# Patient Record
Sex: MALE | Race: WHITE | NOT HISPANIC OR LATINO | Employment: UNEMPLOYED | URBAN - METROPOLITAN AREA
[De-identification: names, ages, dates, MRNs, and addresses within clinical notes are randomized per-mention and may not be internally consistent; named-entity substitution may affect disease eponyms.]

---

## 2023-04-11 ENCOUNTER — OFFICE VISIT (OUTPATIENT)
Dept: PRIMARY CARE | Facility: CLINIC | Age: 43
End: 2023-04-11

## 2023-04-11 VITALS
OXYGEN SATURATION: 98 % | WEIGHT: 185 LBS | TEMPERATURE: 96.8 F | DIASTOLIC BLOOD PRESSURE: 70 MMHG | HEART RATE: 75 BPM | HEIGHT: 70 IN | BODY MASS INDEX: 26.48 KG/M2 | SYSTOLIC BLOOD PRESSURE: 124 MMHG

## 2023-04-11 DIAGNOSIS — G43.109 MIGRAINE WITH AURA AND WITHOUT STATUS MIGRAINOSUS, NOT INTRACTABLE: Primary | ICD-10-CM

## 2023-04-11 DIAGNOSIS — L74.512 HYPERHIDROSIS OF HANDS: Chronic | ICD-10-CM

## 2023-04-11 PROBLEM — R79.89 LOW SERUM TESTOSTERONE LEVEL: Status: ACTIVE | Noted: 2023-04-11

## 2023-04-11 PROBLEM — R10.9 ABDOMINAL PAIN: Status: ACTIVE | Noted: 2023-04-11

## 2023-04-11 PROBLEM — E29.1 HYPOGONADISM MALE: Status: ACTIVE | Noted: 2023-04-11

## 2023-04-11 PROBLEM — H10.431: Status: ACTIVE | Noted: 2023-04-11

## 2023-04-11 PROBLEM — R53.83 FATIGUE: Status: ACTIVE | Noted: 2023-04-11

## 2023-04-11 PROBLEM — G47.00 INSOMNIA: Status: ACTIVE | Noted: 2023-04-11

## 2023-04-11 PROBLEM — R10.11 ABDOMINAL PAIN, RUQ (RIGHT UPPER QUADRANT): Status: RESOLVED | Noted: 2023-04-11 | Resolved: 2023-04-11

## 2023-04-11 PROBLEM — H02.889 MGD (MEIBOMIAN GLAND DYSFUNCTION): Status: ACTIVE | Noted: 2023-04-11

## 2023-04-11 PROBLEM — N41.9 PROSTATITIS: Status: ACTIVE | Noted: 2023-04-11

## 2023-04-11 PROBLEM — J30.9 ALLERGIC RHINITIS: Status: ACTIVE | Noted: 2023-04-11

## 2023-04-11 PROBLEM — F32.A DEPRESSION: Status: ACTIVE | Noted: 2023-04-11

## 2023-04-11 PROBLEM — E55.9 VITAMIN D DEFICIENCY: Status: ACTIVE | Noted: 2023-04-11

## 2023-04-11 PROBLEM — H01.009 BLEPHARITIS: Status: ACTIVE | Noted: 2023-04-11

## 2023-04-11 PROBLEM — N62 HYPERTROPHY OF BREAST: Status: ACTIVE | Noted: 2023-04-11

## 2023-04-11 PROBLEM — F41.9 ANXIETY DISORDER: Status: ACTIVE | Noted: 2023-04-11

## 2023-04-11 PROBLEM — A74.0: Status: RESOLVED | Noted: 2023-04-11 | Resolved: 2023-04-11

## 2023-04-11 PROBLEM — R51.9 HEADACHE: Status: ACTIVE | Noted: 2023-04-11

## 2023-04-11 PROCEDURE — 99213 OFFICE O/P EST LOW 20 MIN: CPT | Performed by: FAMILY MEDICINE

## 2023-04-11 RX ORDER — TRAMADOL HYDROCHLORIDE 50 MG/1
50 TABLET ORAL EVERY 6 HOURS PRN
Qty: 15 TABLET | Refills: 0 | Status: SHIPPED | OUTPATIENT
Start: 2023-04-11 | End: 2023-04-18

## 2023-04-11 RX ORDER — PROPRANOLOL HYDROCHLORIDE 20 MG/1
1 TABLET ORAL DAILY
COMMUNITY
Start: 2013-11-27

## 2023-04-11 RX ORDER — METHYLPREDNISOLONE 4 MG/1
TABLET ORAL
Qty: 21 TABLET | Refills: 0 | Status: SHIPPED | OUTPATIENT
Start: 2023-04-11 | End: 2023-04-18

## 2023-04-11 RX ORDER — LORATADINE 10 MG/1
1 TABLET ORAL DAILY
COMMUNITY
Start: 2013-11-27

## 2023-04-11 RX ORDER — DOXYCYCLINE HYCLATE 100 MG
1 TABLET ORAL EVERY 12 HOURS
COMMUNITY
Start: 2021-10-22

## 2023-04-11 RX ORDER — TIZANIDINE HYDROCHLORIDE 4 MG/1
4 CAPSULE, GELATIN COATED ORAL 3 TIMES DAILY
Qty: 90 CAPSULE | Refills: 11 | Status: SHIPPED | OUTPATIENT
Start: 2023-04-11 | End: 2023-04-12

## 2023-04-11 RX ORDER — CYCLOBENZAPRINE HCL 5 MG
1 TABLET ORAL 3 TIMES DAILY PRN
COMMUNITY
Start: 2021-01-13 | End: 2023-04-11 | Stop reason: ALTCHOICE

## 2023-04-11 RX ORDER — CHOLECALCIFEROL (VITAMIN D3) 25 MCG
TABLET ORAL
COMMUNITY
Start: 2020-10-26

## 2023-04-11 RX ORDER — SUMATRIPTAN 50 MG/1
TABLET, FILM COATED ORAL
COMMUNITY
Start: 2021-01-13

## 2023-04-11 ASSESSMENT — PAIN SCALES - GENERAL: PAINLEVEL: 0-NO PAIN

## 2023-04-11 NOTE — PROGRESS NOTES
Subjective   Patient ID: Chris Engle is a 42 y.o. male.    Also with hyperhydrosis of hands. Severe.    Migraine   This is a recurrent problem. The current episode started more than 1 year ago. The problem occurs intermittently. The problem has been gradually worsening. The pain is located in the Bilateral region. The pain quality is similar to prior headaches. The quality of the pain is described as shooting, pulsating and stabbing. Associated symptoms include neck pain. Pertinent negatives include no abdominal pain, back pain, coughing, dizziness, ear pain, eye pain, fever, hearing loss, nausea, numbness, rhinorrhea, sore throat, vomiting or weakness. The symptoms are aggravated by emotional stress and work. He has tried acetaminophen, cold packs, darkened room, Excedrin, NSAIDs and triptans for the symptoms. The treatment provided moderate relief. His past medical history is significant for migraine headaches. There is no history of hypertension or recent head traumas.       Review of Systems   Constitutional:  Negative for fatigue, fever and unexpected weight change.   HENT:  Negative for congestion, ear pain, hearing loss, rhinorrhea, sore throat and trouble swallowing.    Eyes:  Negative for pain and visual disturbance.   Respiratory:  Negative for cough and shortness of breath.    Cardiovascular:  Negative for chest pain, palpitations and leg swelling.   Gastrointestinal:  Negative for abdominal pain, blood in stool, diarrhea, nausea and vomiting.   Genitourinary:  Negative for dysuria, frequency, hematuria and urgency.   Musculoskeletal:  Positive for neck pain. Negative for back pain and joint swelling.   Skin:  Negative for pallor and rash.   Neurological:  Positive for headaches. Negative for dizziness, syncope, weakness and numbness.   Psychiatric/Behavioral:  Negative for confusion, decreased concentration, hallucinations and suicidal ideas.      Vitals:    04/11/23 1606   BP: 124/70   Pulse: 75    Temp: 36 °C (96.8 °F)   SpO2: 98%      Objective   Physical Exam  Constitutional:       Appearance: Normal appearance.   Neck:      Comments: Tender over L C2-3 facet area.  Cardiovascular:      Rate and Rhythm: Normal rate and regular rhythm.      Heart sounds: Normal heart sounds.   Pulmonary:      Effort: Pulmonary effort is normal.      Breath sounds: Normal breath sounds.   Musculoskeletal:      Cervical back: Neck supple.   Skin:     General: Skin is warm and dry.   Neurological:      General: No focal deficit present.      Mental Status: He is alert.   Psychiatric:         Mood and Affect: Mood normal.         Speech: Speech normal.         Behavior: Behavior normal.         Cognition and Memory: Cognition normal.         Assessment/Plan   Diagnoses and all orders for this visit:  Migraine with aura and without status migrainosus, not intractable  Comments:  Component of occiptial neuralgia, discussed exercises, prevention. Consider nerve ablation.  Orders:  -     methylPREDNISolone (Medrol Dospak) 4 mg tablets; Take as directed on package.  -     traMADol (Ultram) 50 mg tablet; Take 1 tablet (50 mg) by mouth every 6 hours if needed for severe pain (7 - 10) for up to 7 days.  Hyperhidrosis of hands  Comments:  Refer to thoracic surgery for sympathectomy.  Orders:  -     Referral to Thoracic Surgery; Future

## 2023-04-12 DIAGNOSIS — G43.109 MIGRAINE WITH AURA AND WITHOUT STATUS MIGRAINOSUS, NOT INTRACTABLE: Primary | ICD-10-CM

## 2023-04-12 RX ORDER — TIZANIDINE 4 MG/1
4 TABLET ORAL EVERY 6 HOURS PRN
Qty: 40 TABLET | Refills: 2 | Status: SHIPPED | OUTPATIENT
Start: 2023-04-12 | End: 2023-04-22

## 2023-04-23 ASSESSMENT — ENCOUNTER SYMPTOMS
NAUSEA: 0
HEADACHES: 1
NECK PAIN: 1
HALLUCINATIONS: 0
EYE PAIN: 0
VOMITING: 0
WEAKNESS: 0
FEVER: 0
DIZZINESS: 0
BLOOD IN STOOL: 0
FREQUENCY: 0
DECREASED CONCENTRATION: 0
ABDOMINAL PAIN: 0
HEMATURIA: 0
CONFUSION: 0
PALPITATIONS: 0
DYSURIA: 0
TROUBLE SWALLOWING: 0
DIARRHEA: 0
FATIGUE: 0
JOINT SWELLING: 0
SORE THROAT: 0
BACK PAIN: 0
RHINORRHEA: 0
COUGH: 0
MIGRAINE HEADACHES: 1
SHORTNESS OF BREATH: 0
UNEXPECTED WEIGHT CHANGE: 0
NUMBNESS: 0

## 2023-06-01 ENCOUNTER — OFFICE VISIT (OUTPATIENT)
Dept: PRIMARY CARE | Facility: CLINIC | Age: 43
End: 2023-06-01
Payer: COMMERCIAL

## 2023-06-01 VITALS
SYSTOLIC BLOOD PRESSURE: 128 MMHG | HEIGHT: 70 IN | HEART RATE: 83 BPM | WEIGHT: 184 LBS | OXYGEN SATURATION: 99 % | TEMPERATURE: 97.2 F | BODY MASS INDEX: 26.34 KG/M2 | DIASTOLIC BLOOD PRESSURE: 84 MMHG

## 2023-06-01 DIAGNOSIS — M62.838 TRAPEZIUS MUSCLE SPASM: ICD-10-CM

## 2023-06-01 DIAGNOSIS — G43.109 MIGRAINE WITH AURA AND WITHOUT STATUS MIGRAINOSUS, NOT INTRACTABLE: Primary | ICD-10-CM

## 2023-06-01 DIAGNOSIS — R10.31 RIGHT LOWER QUADRANT ABDOMINAL PAIN: ICD-10-CM

## 2023-06-01 DIAGNOSIS — L74.512 HYPERHIDROSIS OF HANDS: ICD-10-CM

## 2023-06-01 DIAGNOSIS — M54.81 OCCIPITAL NEURALGIA OF LEFT SIDE: ICD-10-CM

## 2023-06-01 PROBLEM — R10.9 ABDOMINAL PAIN: Status: RESOLVED | Noted: 2023-04-11 | Resolved: 2023-06-01

## 2023-06-01 PROBLEM — N41.9 PROSTATITIS: Status: RESOLVED | Noted: 2023-04-11 | Resolved: 2023-06-01

## 2023-06-01 PROBLEM — R61 HYPERHIDROSIS: Status: ACTIVE | Noted: 2018-12-27

## 2023-06-01 PROBLEM — R51.9 HEADACHE: Status: RESOLVED | Noted: 2023-04-11 | Resolved: 2023-06-01

## 2023-06-01 PROBLEM — R61 HYPERHIDROSIS: Status: RESOLVED | Noted: 2018-12-27 | Resolved: 2023-06-01

## 2023-06-01 PROBLEM — H01.009 BLEPHARITIS: Status: RESOLVED | Noted: 2023-04-11 | Resolved: 2023-06-01

## 2023-06-01 PROCEDURE — 99214 OFFICE O/P EST MOD 30 MIN: CPT | Performed by: FAMILY MEDICINE

## 2023-06-01 PROCEDURE — 20553 NJX 1/MLT TRIGGER POINTS 3/>: CPT | Performed by: FAMILY MEDICINE

## 2023-06-01 RX ORDER — MULTIVITAMIN
1 TABLET ORAL
COMMUNITY

## 2023-06-01 ASSESSMENT — ENCOUNTER SYMPTOMS
SHORTNESS OF BREATH: 0
NUMBNESS: 0
UNEXPECTED WEIGHT CHANGE: 0
SORE THROAT: 0
ABDOMINAL PAIN: 1
PALPITATIONS: 0
DECREASED CONCENTRATION: 0
FREQUENCY: 0
DIAPHORESIS: 1
EYE PAIN: 0
TROUBLE SWALLOWING: 0
DYSURIA: 0
BLOOD IN STOOL: 0
CONFUSION: 0
DIZZINESS: 0
JOINT SWELLING: 0
HEADACHES: 0
WEAKNESS: 0
HALLUCINATIONS: 0
VOMITING: 0
NAUSEA: 0
FATIGUE: 1
DIARRHEA: 0
COUGH: 0
FEVER: 0
HEMATURIA: 0

## 2023-06-01 ASSESSMENT — PAIN SCALES - GENERAL: PAINLEVEL: 0-NO PAIN

## 2023-06-01 NOTE — PATIENT INSTRUCTIONS
It was nice to see you today!  Discussed current concerns and addressed   Reviewed recent labs and diagnostics  Reviewed medications list  Continue to eat a healthy diet, exercise at least 3 times a week or more  Plan and follow up discussed  For any further information related to your condition, copy and paste or go to [email or web address]  Discussed chronic tramadol not appropriate in his situation  I realize he is in pain but will need to try other options such as medications and PT  Discussed ablation with pain man and he is open to this. We will get things going as it may take a while to get in to pain management  To GI, should have scope soon with family history, discussed diet  Trigger point injection given, send for massage therapy at [facility]

## 2023-06-01 NOTE — PROGRESS NOTES
Subjective   Patient ID: Chris Engle is a 42 y.o. male.    Patient has chronic headaches and left neck pain.  It sounds like occipital neuralgia.  It is worse in the left lateral neck, the trapezius gets tender and it radiates in a josh horn distribution in the left part of the head.  It is positional and random in onset.  It has been getting worse over the last few years.  He just got insurance.  He has not had an MRI.  He has no focal neurological deficits such as numbness, tingling, weakness or visual changes.  Triptans did not work.  I gave him a Medrol Dosepak and it seemed like it might of helped but not right away.  Massage helps.  He has not done physical therapy.  He has done stretches on his own.  He has had a few prescriptions of tramadol but I discussed that this is not a solution to the problem.  He has a medical marijuana card and I also discussed that most likely he will not find someone to write that form as long as he is filling the edibles.  Also with severe hyperhidrosis of the hands.  I have given him the name of a thoracic surgeon for sympathectomy.  He has not called yet.  Also with right middle quadrant pain off and on for years.  More towards upper quadrant.  Ultrasound is unremarkable.  It is intermittent and possibly with eating.  No specific foods.  He has about 3 first and second-degree family members with colon cancer.  He has no blood in the stool or unexplained weight loss.        Review of Systems   Constitutional:  Positive for diaphoresis and fatigue. Negative for fever and unexpected weight change.   HENT:  Negative for congestion, ear pain, hearing loss, sore throat and trouble swallowing.    Eyes:  Negative for pain and visual disturbance.   Respiratory:  Negative for cough and shortness of breath.    Cardiovascular:  Negative for chest pain, palpitations and leg swelling.   Gastrointestinal:  Positive for abdominal pain. Negative for blood in stool, diarrhea, nausea and  vomiting.   Genitourinary:  Negative for dysuria, frequency, hematuria and urgency.   Musculoskeletal:  Negative for joint swelling.   Skin:  Negative for pallor and rash.   Neurological:  Negative for dizziness, syncope, weakness, numbness and headaches.   Psychiatric/Behavioral:  Negative for confusion, decreased concentration, hallucinations and suicidal ideas.      Vitals:    06/01/23 1018   BP: 128/84   Pulse: 83   Temp: 36.2 °C (97.2 °F)   SpO2: 99%      Objective   Physical Exam  Constitutional:       Appearance: Normal appearance. He is not toxic-appearing.   HENT:      Head: Normocephalic and atraumatic.      Right Ear: Tympanic membrane normal.      Left Ear: Tympanic membrane normal.      Nose: Nose normal.      Mouth/Throat:      Mouth: Mucous membranes are moist.      Pharynx: Oropharynx is clear. No oropharyngeal exudate or posterior oropharyngeal erythema.   Eyes:      General: No scleral icterus.     Extraocular Movements: Extraocular movements intact.      Conjunctiva/sclera: Conjunctivae normal.      Pupils: Pupils are equal, round, and reactive to light.   Neck:      Comments: Tender L trapezius area. Cleaned and inj 9cc of soln on 1cc kenalog, 4cc marcaine, 4cc lidocaine. Help with current pain and spasm.  Cardiovascular:      Rate and Rhythm: Normal rate and regular rhythm.      Pulses: Normal pulses.   Pulmonary:      Effort: No respiratory distress.      Breath sounds: Normal breath sounds. No wheezing or rhonchi.   Abdominal:      Palpations: Abdomen is soft. There is no mass.      Tenderness: There is abdominal tenderness.   Musculoskeletal:         General: Normal range of motion.      Cervical back: Rigidity and tenderness present.   Lymphadenopathy:      Cervical: No cervical adenopathy.   Skin:     General: Skin is warm and dry.      Findings: No rash.   Neurological:      General: No focal deficit present.      Mental Status: He is alert and oriented to person, place, and time.       Cranial Nerves: No cranial nerve deficit.      Motor: No weakness.   Psychiatric:         Mood and Affect: Mood normal.         Behavior: Behavior normal.         Thought Content: Thought content normal.         Assessment/Plan   Diagnoses and all orders for this visit:  Migraine with aura and without status migrainosus, not intractable  Hyperhidrosis of hands  Occipital neuralgia of left side  -     Referral to Pain Medicine; Future  -     MR cervical spine wo IV contrast; Future  -     Referral to [facility]; Future  Right lower quadrant abdominal pain  -     Referral to Gastroenterology; Future

## 2023-06-14 ENCOUNTER — APPOINTMENT (OUTPATIENT)
Dept: PRIMARY CARE | Facility: CLINIC | Age: 43
End: 2023-06-14
Payer: COMMERCIAL

## 2023-10-23 ENCOUNTER — OFFICE VISIT (OUTPATIENT)
Dept: GASTROENTEROLOGY | Facility: CLINIC | Age: 43
End: 2023-10-23
Payer: COMMERCIAL

## 2023-10-23 VITALS — HEART RATE: 80 BPM | HEIGHT: 70 IN | BODY MASS INDEX: 26.34 KG/M2 | WEIGHT: 184 LBS

## 2023-10-23 DIAGNOSIS — R10.31 RIGHT LOWER QUADRANT ABDOMINAL PAIN: Primary | ICD-10-CM

## 2023-10-23 DIAGNOSIS — K59.00 CONSTIPATION, UNSPECIFIED CONSTIPATION TYPE: ICD-10-CM

## 2023-10-23 PROCEDURE — 99204 OFFICE O/P NEW MOD 45 MIN: CPT | Performed by: INTERNAL MEDICINE

## 2023-10-23 PROCEDURE — 1036F TOBACCO NON-USER: CPT | Performed by: INTERNAL MEDICINE

## 2023-10-23 RX ORDER — POLYETHYLENE GLYCOL-3350 AND ELECTROLYTES WITH FLAVOR PACK 240; 5.84; 2.98; 6.72; 22.72 G/278.26G; G/278.26G; G/278.26G; G/278.26G; G/278.26G
4000 POWDER, FOR SOLUTION ORAL ONCE
Qty: 4000 ML | Refills: 0 | Status: SHIPPED | OUTPATIENT
Start: 2023-10-23 | End: 2023-10-23

## 2023-10-23 RX ORDER — CYANOCOBALAMIN (VITAMIN B-12) 500 MCG
1 TABLET ORAL NIGHTLY PRN
COMMUNITY

## 2023-10-23 NOTE — PROGRESS NOTES
REASON FOR VISIT: Discuss occasional pain in her right lower abdomen    HPI:  Chris Engle is a 43 y.o. male with a past medical history of anxiety and depression being evaluated in the office for intermittent right lower quadrant pain symptoms.  Symptoms of been ongoing for several years intermittently.  Not necessarily postprandial.  Occasionally feels that he may have backed up in stool and will feel better after bowel movements.  No rectal bleeding.  Has strong history of colorectal cancer in maternal grandfather as well as maternal aunts.  Has never had a prior colonoscopy.  Right upper quadrant ultrasound with normal-appearing gallbladder 2 years ago.          PRIOR ENDOSCOPY    PAST MEDICAL HISTORY  Past Medical History:   Diagnosis Date    Adult inclusion conjunctivitis 04/11/2023    Personal history of other mental and behavioral disorders     History of anxiety disorder    Personal history of other specified conditions     History of insomnia    Prostatitis 04/11/2023       PAST SURGICAL HISTORY  Past Surgical History:   Procedure Laterality Date    OTHER SURGICAL HISTORY  01/30/2015    Oral Surgery       FAMILY HISTORY  Family History   Problem Relation Name Age of Onset    No Known Problems Mother      Colon cancer Other FAM HX        SOCIAL HISTORY  Social History     Tobacco Use    Smoking status: Never    Smokeless tobacco: Never   Substance Use Topics    Alcohol use: Not on file       REVIEW OF SYSTEMS  CONSTITUTIONAL: negative for fever, chills, fatigue, or unintentional weight loss,   HEENT: negative for icteric sclera, eye pain/redness, or changes in vision/hearing  RESPIRATORY: negative for cough, hemoptysis, wheezing, orthopnea, or dyspnea on exertion  CARDIOVASCULAR: negative for chest pain, palpitations, or syncope   GASTROINTESTINAL: as noted per HPI  GENITOURINARY: negative for dysuria, polyuria, incontinence, or hematuria  MUSCULOSKELETAL: negative for arthralgia, myalgia, or joint  "swelling/stiffness   INTEGUMENTARY/SKIN: negative jaundice, rash, or skin lesion  HEMATOLOGIC/LYMPHATIC: negative for prolonged bleeding, easy bruising, or swollen lymph nodes  ENDOCRINE: negative for cold/heat intolerance, polydipsia, polyuria, or goiter  NEUROLOGIC: negative for headaches, dizziness, tremor, or gait abnormality  PSYCHIATRIC: negative for anxiety, depression, personality changes, or sleep disturbances        A 10 point review of systems was completed and was otherwise negative.    ALLERGIES  Allergies   Allergen Reactions    Cat Dander Unknown     shortness of breath, swollen nasal passages    Pollen Extracts Other     Itchy eyes, runny nose, nasal congestion       MEDICATIONS  Current Outpatient Medications   Medication Sig Dispense Refill    cholecalciferol (Vitamin D-3) 25 MCG (1000 UT) tablet Take by mouth.      loratadine (Claritin) 10 mg tablet Take 1 tablet (10 mg) by mouth once daily.      multivitamin tablet Take 1 tablet by mouth once daily.      doxycycline (Vibra-Tabs) 100 mg tablet Take 1 tablet (100 mg) by mouth every 12 hours.      melatonin tablet 1 tablet (1 mg) as needed at bedtime for sleep.      polyethylene glycol-electrolytes (GaviLyte-C) 420 gram solution Take 4,000 mL by mouth 1 time for 1 dose. Drink 8 oz every 10-15 minutes until solution is gone 4000 mL 0    propranolol (Inderal) 20 mg tablet Take 1 tablet (20 mg) by mouth once daily.      SUMAtriptan (Imitrex) 50 mg tablet Take by mouth.      tiZANidine (Zanaflex) 4 mg tablet Take 1 tablet (4 mg) by mouth every 6 hours if needed for muscle spasms for up to 10 days. 40 tablet 2     No current facility-administered medications for this visit.       VITALS  Pulse 80   Ht 1.778 m (5' 10\")   Wt 83.5 kg (184 lb)   BMI 26.40 kg/m²      PHYSICAL EXAM  CONSTITUTIONAL: no acute distress, appears stated age, well-appearing  EYES: anicteric sclera, sclera clear, no conjunctival pallor  HEAD: normocephalic, atraumatic   NECK: " supple   PULMONARY: clear to auscultation bilaterally, no increased work of breathing   CARDIOVASCULAR: regular rate and rhythm, no murmurs/rubs/gallops appreciated  ABDOMEN: soft, non-tender, positive bowel sounds, no rebound or guarding, no appreciable hepatosplenomegaly  LYMPHATIC: no palpable lymphadenopathy in the neck  MUSCULOSKELETAL: normal gait, no cyanosis/clubbing/edema  SKIN: no jaundice or visible rash  NEUROLOGIC: no gross motor deficits, cranial nerves grossly intact  PSYCHIATRIC: alert and oriented to person/place/time, appropriate insight and judgement        ASSESSMENT/ PLAN  Patient with intermittent right lower quadrant pain.  May be secondary to constipation.  Has been intermittent and no focal tenderness on exam today.  He is due for screening colonoscopy given strong family history of colorectal cancer.  We will also rule out any inflammatory changes in ileocecal valve possibly in the ileum given his location of pain.  He is in agreement with the plan will follow-up with me at colonoscopy.        Signature: Sandro Milan MD    Date: 10/23/2023  Time: 3:03 PM

## 2023-11-10 ENCOUNTER — OFFICE VISIT (OUTPATIENT)
Dept: GASTROENTEROLOGY | Facility: EXTERNAL LOCATION | Age: 43
End: 2023-11-10
Payer: COMMERCIAL

## 2023-11-10 DIAGNOSIS — Z12.11 ENCOUNTER FOR SCREENING FOR MALIGNANT NEOPLASM OF COLON: ICD-10-CM

## 2023-11-10 DIAGNOSIS — Z80.0 FAMILY HISTORY OF MALIGNANT NEOPLASM OF GASTROINTESTINAL TRACT: Primary | ICD-10-CM

## 2023-11-10 PROCEDURE — 45378 DIAGNOSTIC COLONOSCOPY: CPT | Performed by: INTERNAL MEDICINE

## 2023-11-10 PROCEDURE — 1036F TOBACCO NON-USER: CPT | Performed by: INTERNAL MEDICINE

## 2025-07-03 ENCOUNTER — APPOINTMENT (OUTPATIENT)
Dept: PRIMARY CARE | Facility: CLINIC | Age: 45
End: 2025-07-03
Payer: COMMERCIAL

## 2025-07-03 VITALS
OXYGEN SATURATION: 98 % | BODY MASS INDEX: 25.2 KG/M2 | DIASTOLIC BLOOD PRESSURE: 70 MMHG | HEART RATE: 88 BPM | SYSTOLIC BLOOD PRESSURE: 110 MMHG | TEMPERATURE: 98.1 F | WEIGHT: 180 LBS | HEIGHT: 71 IN

## 2025-07-03 DIAGNOSIS — M54.2 NECK PAIN: Primary | ICD-10-CM

## 2025-07-03 PROCEDURE — 3008F BODY MASS INDEX DOCD: CPT | Performed by: FAMILY MEDICINE

## 2025-07-03 PROCEDURE — 99213 OFFICE O/P EST LOW 20 MIN: CPT | Performed by: FAMILY MEDICINE

## 2025-07-03 ASSESSMENT — PAIN SCALES - GENERAL: PAINLEVEL_OUTOF10: 0-NO PAIN

## 2025-07-03 NOTE — PATIENT INSTRUCTIONS
Try stretches for shoulder and neck  If no improvement then consider PT, diagnostic studies.   Needs PE, labs

## 2025-07-13 ASSESSMENT — ENCOUNTER SYMPTOMS
NECK STIFFNESS: 1
DIZZINESS: 0
FEVER: 0
TREMORS: 0
SEIZURES: 1
GASTROINTESTINAL NEGATIVE: 1
CARDIOVASCULAR NEGATIVE: 1
WEAKNESS: 0
RESPIRATORY NEGATIVE: 1
FATIGUE: 0
NECK PAIN: 1
ARTHRALGIAS: 1

## 2025-07-13 NOTE — PROGRESS NOTES
"Subjective   Patient ID: Chris Engle is a 44 y.o. male.    Chronic neck issues and R shoulder issues. No focal neurological deficit. No trauma. No weakness.        Review of Systems   Constitutional:  Negative for fatigue and fever.   HENT: Negative.     Respiratory: Negative.     Cardiovascular: Negative.    Gastrointestinal: Negative.    Musculoskeletal:  Positive for arthralgias, neck pain and neck stiffness.   Neurological:  Positive for seizures. Negative for dizziness, tremors and weakness.     Vitals:    07/03/25 1234   BP: 110/70   Pulse: 88   Temp: 36.7 °C (98.1 °F)   SpO2: 98%      Body mass index is 25.1 kg/m².  Objective   Physical Exam  Constitutional:       General: He is not in acute distress.     Appearance: He is not toxic-appearing.   Neck:      Comments: Neck with discomfort with ROM exam. Anterior R shoulder tenderness. No weakness.  Neurological:      General: No focal deficit present.      Mental Status: He is alert and oriented to person, place, and time.   Psychiatric:         Mood and Affect: Mood normal.         Behavior: Behavior normal.         Thought Content: Thought content normal.         Judgment: Judgment normal.         Last Labs:     CMP:   Lab Results   Component Value Date    CALCIUM 10.0 11/04/2020    PROT 7.3 11/04/2020    ALBUMIN 5.0 11/04/2020    AST 14 11/04/2020    ALKPHOS 43 11/04/2020    BILITOT 0.6 11/04/2020     CBC:   Lab Results   Component Value Date    WBC 9.8 11/04/2020    HGB 15.0 11/04/2020    HCT 44.3 11/04/2020    MCV 84 11/04/2020     11/04/2020     A1C:   No results found for: \"HGBA1C\"  LIPID PANEL:   Lab Results   Component Value Date    CHOL 185 11/04/2020    TRIG 129 11/04/2020    HDL 47.2 11/04/2020    CHHDL 3.9 11/04/2020    LDLF 112 (H) 11/04/2020    VLDL 26 11/04/2020     TSH:   Lab Results   Component Value Date    TSH 1.72 11/04/2020     PSA:   No results found for: \"PSA\"     Assessment/Plan   There are no diagnoses linked to this " encounter.